# Patient Record
Sex: MALE | Race: WHITE
[De-identification: names, ages, dates, MRNs, and addresses within clinical notes are randomized per-mention and may not be internally consistent; named-entity substitution may affect disease eponyms.]

---

## 2017-09-13 ENCOUNTER — HOSPITAL ENCOUNTER (OUTPATIENT)
Dept: HOSPITAL 74 - FASU-ENDO | Age: 72
Discharge: HOME | End: 2017-09-13
Attending: INTERNAL MEDICINE
Payer: COMMERCIAL

## 2017-09-13 VITALS — HEART RATE: 69 BPM | DIASTOLIC BLOOD PRESSURE: 65 MMHG | SYSTOLIC BLOOD PRESSURE: 110 MMHG

## 2017-09-13 VITALS — TEMPERATURE: 98 F

## 2017-09-13 VITALS — BODY MASS INDEX: 27.3 KG/M2

## 2017-09-13 DIAGNOSIS — Z80.0: ICD-10-CM

## 2017-09-13 DIAGNOSIS — Z86.010: Primary | ICD-10-CM

## 2017-09-13 DIAGNOSIS — K64.8: ICD-10-CM

## 2017-09-13 PROCEDURE — 0DJD8ZZ INSPECTION OF LOWER INTESTINAL TRACT, VIA NATURAL OR ARTIFICIAL OPENING ENDOSCOPIC: ICD-10-PCS | Performed by: INTERNAL MEDICINE

## 2022-09-13 ENCOUNTER — TRANSCRIPTION ENCOUNTER (OUTPATIENT)
Age: 77
End: 2022-09-13

## 2022-09-19 ENCOUNTER — TRANSCRIPTION ENCOUNTER (OUTPATIENT)
Age: 77
End: 2022-09-19

## 2023-03-07 PROBLEM — Z00.00 ENCOUNTER FOR PREVENTIVE HEALTH EXAMINATION: Status: ACTIVE | Noted: 2023-03-07

## 2023-03-29 ENCOUNTER — HOSPITAL ENCOUNTER (OUTPATIENT)
Dept: HOSPITAL 74 - FASU-ENDO | Age: 78
Discharge: HOME | End: 2023-03-29
Attending: INTERNAL MEDICINE
Payer: COMMERCIAL

## 2023-03-29 VITALS
SYSTOLIC BLOOD PRESSURE: 112 MMHG | TEMPERATURE: 98 F | HEART RATE: 72 BPM | DIASTOLIC BLOOD PRESSURE: 62 MMHG | RESPIRATION RATE: 19 BRPM

## 2023-03-29 VITALS — BODY MASS INDEX: 27.7 KG/M2

## 2023-03-29 DIAGNOSIS — K64.0: ICD-10-CM

## 2023-03-29 DIAGNOSIS — Z80.0: ICD-10-CM

## 2023-03-29 DIAGNOSIS — K57.30: ICD-10-CM

## 2023-03-29 DIAGNOSIS — Z86.010: ICD-10-CM

## 2023-03-29 DIAGNOSIS — Z12.11: Primary | ICD-10-CM

## 2023-03-29 PROCEDURE — 0DJD8ZZ INSPECTION OF LOWER INTESTINAL TRACT, VIA NATURAL OR ARTIFICIAL OPENING ENDOSCOPIC: ICD-10-PCS | Performed by: INTERNAL MEDICINE

## 2023-04-05 ENCOUNTER — APPOINTMENT (OUTPATIENT)
Dept: RADIATION ONCOLOGY | Facility: CLINIC | Age: 78
End: 2023-04-05
Payer: MEDICARE

## 2023-04-05 VITALS
OXYGEN SATURATION: 98 % | TEMPERATURE: 97.8 F | BODY MASS INDEX: 28.14 KG/M2 | HEIGHT: 69 IN | DIASTOLIC BLOOD PRESSURE: 61 MMHG | HEART RATE: 78 BPM | RESPIRATION RATE: 16 BRPM | SYSTOLIC BLOOD PRESSURE: 131 MMHG | WEIGHT: 190 LBS

## 2023-04-05 PROCEDURE — 99205 OFFICE O/P NEW HI 60 MIN: CPT | Mod: 25

## 2023-04-11 NOTE — VITALS
[Maximal Pain Intensity: 3/10] : 3/10 [Least Pain Intensity: 0/10] : 0/10 [90: Able to carry normal activity; minor signs or symptoms of disease.] : 90: Able to carry normal activity; minor signs or symptoms of disease.  [Date: ____________] : Patient's last distress assessment performed on [unfilled]. [0 - No Distress] : Distress Level: 0

## 2023-04-11 NOTE — DISEASE MANAGEMENT
[>20] : >20 ng/mL [Biopsy] : Patient had a biopsy on [7(3+4)] : Template Biopsy [place] Score: 7(3+4) [1] : T1 [c] : c [0] : N0 [X] : MX [] : Patient had a Prostate MRI [5] : 5 [Extracapsular Extension] : Extracapsular extension [BiopsyDate] : 05/22 [MeasuredProstateVolume] : 70 [TotalCores] : 6 [TotalPositiveCores] : 1 [MaxCoreInvolvement] : 5 [IIIA] : IIIA [LastPSADate] : 08/22 [LastPSAResults] : 23.1

## 2023-04-11 NOTE — PHYSICAL EXAM
[Normal] : oriented to person, place and time, the affect was normal, the mood was normal and not anxious [FreeTextEntry1] : Unable to palpate any nodules, prostate was enlarged

## 2023-04-11 NOTE — HISTORY OF PRESENT ILLNESS
[FreeTextEntry1] : Mr. 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  MRI demonstrated suspicious for extracapsular extension.\par \par Patient was found to have a PSA of 23.1 ng/mL on 8/30/22. Prostate biopsy was performed on 5/19/22 and revealed [place] 7 (3+4) left mid gland in 1/6 cores . Mr. Cowan is a patient of  urologist Dr. Rico Pichardo who  has been monitoring patients PSA and biopsy results since 2018.\par \par \par MRI prostate (12/27/22) demonstrated a eft peripheral zone PIRADS 4 lesion (mid gland-apex) and larger PIRADS 5 curvilinear right-sided peripheral zone lesion (base to mid gland).  PIRADS 5- Very high. No gross extraprostatic extension, seminal vesicle invasion, or pelvic lymphadenopathy. Broad-based capsular abutment of the right peripheral zone lesion versus suspicion for microscopic extracapsular extension. 6 mm nodule anterior to the right iliacus muscle of uncertain significance. \par \par \par The patient reports no dysuria,  or hematuria.  He has come frequency and urgency mostly at night, he notes noctura x2, he denies erectile dysfunction.  His bowels are regular his last colonoscopy was in 2023 according to the patient, official report is unavailable at this time. Mr. Cowan exercises daily, he skiis and golfs regularly and has a healthy appetite.\par

## 2023-04-11 NOTE — LETTER CLOSING
[Consult Closing:] : Thank you for allowing me to participate in the care of this patient.  If you have any questions, please do not hesitate to contact me. [Sincerely yours,] : Sincerely yours, [FreeTextEntry3] : Radha Osborne MD\par

## 2023-05-02 ENCOUNTER — NON-APPOINTMENT (OUTPATIENT)
Age: 78
End: 2023-05-02

## 2023-06-20 ENCOUNTER — NON-APPOINTMENT (OUTPATIENT)
Age: 78
End: 2023-06-20

## 2023-06-20 VITALS
DIASTOLIC BLOOD PRESSURE: 78 MMHG | OXYGEN SATURATION: 98 % | SYSTOLIC BLOOD PRESSURE: 152 MMHG | HEIGHT: 69 IN | RESPIRATION RATE: 18 BRPM | HEART RATE: 71 BPM

## 2023-06-20 DIAGNOSIS — R30.0 DYSURIA: ICD-10-CM

## 2023-06-21 NOTE — DISEASE MANAGEMENT
[Clinical] : TNM Stage: c [IIIA] : IIIA [1] : T1 [c] : c [0] : N0 [X] : MX [>20] : >20 ng/mL [Biopsy] : Patient had a biopsy on [7(3+4)] : Template Biopsy [place] Score: 7(3+4) [] : Patient had a Prostate MRI [5] : 5 [Extracapsular Extension] : Extracapsular extension [BiopsyDate] : 05/22 [MeasuredProstateVolume] : 70 [TotalCores] : 6 [TotalPositiveCores] : 1 [MaxCoreInvolvement] : 5 [TTNM] : 1c [NTNM] : 0 [MTNM] : 0 [LastPSADate] : 08/22 [LastPSAResults] : 23.1

## 2023-06-21 NOTE — HISTORY OF PRESENT ILLNESS
[FreeTextEntry1] : Mr. 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 MRI demonstrated suspicion for extracapsular extension.\par \par 6/20/2023\par Mr. Cowan presents today for his  OTV, completed 5/26 fractions to a dose of 1350 cGy. He notes a history of nocturia x 2 and since starting treatment that has increased to 3-4. He denies diarrhea. He is in the process of selling his house and has been very busy moving.

## 2023-06-27 ENCOUNTER — NON-APPOINTMENT (OUTPATIENT)
Age: 78
End: 2023-06-27

## 2023-06-27 VITALS
SYSTOLIC BLOOD PRESSURE: 151 MMHG | OXYGEN SATURATION: 98 % | HEIGHT: 69 IN | DIASTOLIC BLOOD PRESSURE: 76 MMHG | RESPIRATION RATE: 18 BRPM | HEART RATE: 74 BPM

## 2023-06-28 ENCOUNTER — NON-APPOINTMENT (OUTPATIENT)
Age: 78
End: 2023-06-28

## 2023-06-28 NOTE — DISEASE MANAGEMENT
[TTNM] : 1c [NTNM] : 0 [MTNM] : 0 [de-identified] : 2700 cGY  [de-identified] : 7020 cGy  [de-identified] : prostate

## 2023-07-03 ENCOUNTER — NON-APPOINTMENT (OUTPATIENT)
Age: 78
End: 2023-07-03

## 2023-07-06 VITALS
DIASTOLIC BLOOD PRESSURE: 70 MMHG | RESPIRATION RATE: 18 BRPM | SYSTOLIC BLOOD PRESSURE: 140 MMHG | HEART RATE: 74 BPM | HEIGHT: 69 IN | OXYGEN SATURATION: 98 %

## 2023-07-07 NOTE — HISTORY OF PRESENT ILLNESS
[FreeTextEntry1] : Mr. 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 MRI demonstrated suspicion for extracapsular extension.\par \par 7/62023\par Mr. Cowan presents today for his  OTV, completed 15/26 fractions to the prostate. He notes urnary frequency but consider this to be a chronic issue. He notes nocturia 2-3/night. He denies diarrhea.

## 2023-07-11 ENCOUNTER — NON-APPOINTMENT (OUTPATIENT)
Age: 78
End: 2023-07-11

## 2023-07-11 VITALS
RESPIRATION RATE: 16 BRPM | HEART RATE: 64 BPM | HEIGHT: 69 IN | SYSTOLIC BLOOD PRESSURE: 148 MMHG | DIASTOLIC BLOOD PRESSURE: 77 MMHG | OXYGEN SATURATION: 99 %

## 2023-07-12 NOTE — DISEASE MANAGEMENT
[Clinical] : TNM Stage: c [IIIA] : IIIA [LastPSADate] : 08/22 [LastPSAResults] : 23.1 [TTNM] : 1c [NTNM] : 0 [MTNM] : 0 [de-identified] : 0613 [de-identified] : 5436 [de-identified] : prostate

## 2023-07-12 NOTE — HISTORY OF PRESENT ILLNESS
[FreeTextEntry1] : Mr. 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 MRI demonstrated suspicion for extracapsular extension.\par \par 7/3/2023\par Mr. Cowan presents today for his  OTV, completed 19/26 fractions to the prostate to a dose of 5130 cGy. He notes that his nocturia has worsened but during the day his frequency and urgency are relatively stable. He also notes constipation like symptoms due to having very small bowel movement with incomplete emptying.

## 2023-07-18 ENCOUNTER — NON-APPOINTMENT (OUTPATIENT)
Age: 78
End: 2023-07-18

## 2023-07-18 VITALS
DIASTOLIC BLOOD PRESSURE: 68 MMHG | SYSTOLIC BLOOD PRESSURE: 141 MMHG | HEIGHT: 69 IN | RESPIRATION RATE: 18 BRPM | OXYGEN SATURATION: 98 % | HEART RATE: 72 BPM

## 2023-07-18 NOTE — REVIEW OF SYSTEMS
[Anal Pain: Grade 0] : Anal Pain: Grade 0 [Constipation: Grade 0] : Constipation: Grade 0 [Diarrhea: Grade 0] : Diarrhea: Grade 0 [Fecal Incontinence: Grade 0] : Fecal Incontinence: Grade 0 [Proctitis: Grade 0] : Proctitis: Grade 0 [Rectal Pain: Grade 0] : Rectal Pain: Grade 0 [Fatigue: Grade 1 - Fatigue relieved by rest] : Fatigue: Grade 1 - Fatigue relieved by rest [Hematuria: Grade 0] : Hematuria: Grade 0 [Urinary Incontinence: Grade 0] : Urinary Incontinence: Grade 0  [Urinary Retention: Grade 0] : Urinary Retention: Grade 0 [Urinary Tract Pain: Grade 0] : Urinary Tract Pain: Grade 0 [Urinary Urgency: Grade 0] : Urinary Urgency: Grade 0 [Urinary Frequency: Grade 0] : Urinary Frequency: Grade 0 [Alopecia: Grade 0] : Alopecia: Grade 0 [Pruritus: Grade 0] : Pruritus: Grade 0 [Skin Atrophy: Grade 0] : Skin Atrophy: Grade 0 [Skin Hyperpigmentation: Grade 0] : Skin Hyperpigmentation: Grade 0 [Skin Induration: Grade 0] : Skin Induration: Grade 0 [Dermatitis Radiation: Grade 0] : Dermatitis Radiation: Grade 0

## 2023-07-23 NOTE — DISEASE MANAGEMENT
[Clinical] : TNM Stage: c [IIIA] : IIIA [TTNM] : 1c [MTNM] : 0 [NTNM] : 0 [de-identified] : 0666 [de-identified] : 3531 [de-identified] : prostate

## 2023-07-23 NOTE — HISTORY OF PRESENT ILLNESS
[FreeTextEntry1] : Mr. 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 MRI demonstrated suspicion for extracapsular extension. \par \par 07/18/23 Pt presents today for OTV. He has completed 23 fractions to a dose of 6210. Pt states he feels well overall and denies any pain or gastrointestinal/urinary disturbance. He feels that his urinary symptoms are stable, nocturia 2-3/night. .

## 2023-08-11 DIAGNOSIS — R33.9 RETENTION OF URINE, UNSPECIFIED: ICD-10-CM

## 2023-08-11 RX ORDER — TAMSULOSIN HYDROCHLORIDE 0.4 MG/1
0.4 CAPSULE ORAL DAILY
Qty: 90 | Refills: 2 | Status: ACTIVE | COMMUNITY
Start: 2023-08-11 | End: 1900-01-01

## 2023-09-07 ENCOUNTER — APPOINTMENT (OUTPATIENT)
Dept: RADIATION ONCOLOGY | Facility: CLINIC | Age: 78
End: 2023-09-07
Payer: MEDICARE

## 2023-09-07 VITALS
HEART RATE: 91 BPM | DIASTOLIC BLOOD PRESSURE: 78 MMHG | RESPIRATION RATE: 17 BRPM | SYSTOLIC BLOOD PRESSURE: 136 MMHG | OXYGEN SATURATION: 98 %

## 2023-09-07 PROCEDURE — 99024 POSTOP FOLLOW-UP VISIT: CPT

## 2023-09-07 RX ORDER — TAMSULOSIN HYDROCHLORIDE 0.4 MG/1
0.4 CAPSULE ORAL
Qty: 30 | Refills: 1 | Status: COMPLETED | COMMUNITY
Start: 2023-06-20 | End: 2023-09-07

## 2023-09-07 RX ORDER — SIMVASTATIN 80 MG/1
TABLET, FILM COATED ORAL
Refills: 0 | Status: ACTIVE | COMMUNITY

## 2023-09-07 RX ORDER — BICALUTAMIDE 50 MG/1
TABLET ORAL
Refills: 0 | Status: ACTIVE | COMMUNITY

## 2023-09-07 NOTE — HISTORY OF PRESENT ILLNESS
[FreeTextEntry1] : Mr. Nikita Cowan is a 77-year-old male diagnosed with high-risk prostate adenocarcinoma, GS 3+4, PSA 23.1ng/ml. MRI demonstrated suspicious for extracapsular extension s/p radiation therapy.   Patient was found to have a PSA of 23.1 ng/mL on 8/30/22. Prostate biopsy was performed on 5/19/22 and revealed [place] 7 (3+4) left mid gland in 1/6 cores. Mr. Cowan is a patient of urologist Dr. Rico Picharod who has been monitoring patients PSA and biopsy results since 2018.  MRI prostate (12/27/22) demonstrated a left peripheral zone PIRADS 4 lesion (mid gland-apex) and larger PIRADS 5 curvilinear right-sided peripheral zone lesion (base to mid gland). PIRADS 5- Very high. No gross extraprostatic extension, seminal vesicle invasion, or pelvic lymphadenopathy. Broad-based capsular abutment of the right peripheral zone lesion versus suspicion for microscopic extracapsular extension. 6 mm nodule anterior to the right iliacus muscle of uncertain significance.  Mr. Cowan was treated with ADT and completed RT to the prostate/SV/nodes to a dose of 7020 cGy on 7/21/23.    Mr. Cowan presents today for post-treatment evaluation. He states he feels well overall. He continues on ADT and reports the main side effect he experiences is loss of libido. He reports nocturia of 3 episodes per night on average and continues on the Flomax. He denies any gastrointestinal symptoms. He feels that his energy level and urinary symptoms have returned to baseline.

## 2023-09-07 NOTE — DISEASE MANAGEMENT
[Clinical] : TNM Stage: c [IIIA] : IIIA [FreeTextEntry4] : High-risk prostate adenocarcinoma GS 3+4 PSA 23.1 ng/ml [TTNM] : 1c [NTNM] : 0 [MTNM] : X

## 2023-09-07 NOTE — REVIEW OF SYSTEMS
[Nocturia] : nocturia [Negative] : Musculoskeletal [Constipation: Grade 0] : Constipation: Grade 0 [Diarrhea: Grade 0] : Diarrhea: Grade 0 [Fatigue: Grade 0] : Fatigue: Grade 0 [Hematuria: Grade 0] : Hematuria: Grade 0 [Urinary Incontinence: Grade 0] : Urinary Incontinence: Grade 0  [Urinary Retention: Grade 0] : Urinary Retention: Grade 0 [Urinary Tract Pain: Grade 0] : Urinary Tract Pain: Grade 0 [Urinary Urgency: Grade 0] : Urinary Urgency: Grade 0 [Urinary Frequency: Grade 0] : Urinary Frequency: Grade 0 [Libido Decreased: Grade 2 - Decrease in sexual interest adversely affecting relationship] : Libido Decreased: Grade 2 - Decrease in sexual interest adversely affecting relationship [Chest Pain] : no chest pain [Shortness Of Breath] : no shortness of breath [Cough] : no cough [Urinary Frequency] : no urinary frequency [Hot Flashes] : no hot flashes [FreeTextEntry8] : 3 episodes per night on average

## 2023-12-28 ENCOUNTER — APPOINTMENT (OUTPATIENT)
Dept: RADIATION ONCOLOGY | Facility: CLINIC | Age: 78
End: 2023-12-28
Payer: MEDICARE

## 2023-12-28 PROCEDURE — 99214 OFFICE O/P EST MOD 30 MIN: CPT

## 2024-01-02 NOTE — HISTORY OF PRESENT ILLNESS
[FreeTextEntry1] : Mr. Nikita Cowan is a 77-year-old male diagnosed with high-risk prostate adenocarcinoma, GS 3+4, PSA 23.1ng/ml. He is status post completion of ADT plus radiation therapy in July 2023, he continues on ADT and presents today for his routine follow-up.   Patient was found to have a PSA of 23.1 ng/mL on 8/30/22. Prostate biopsy was performed on 5/19/22 and revealed [place] 7 (3+4) left mid gland in 1/6 cores. Mr. Cowan is a patient of urologist Dr. Rico Pichardo who has been monitoring patients PSA and biopsy results since 2018.  MRI prostate (12/27/22) demonstrated a left peripheral zone PIRADS 4 lesion (mid gland-apex) and larger PIRADS 5 curvilinear right-sided peripheral zone lesion (base to mid gland). PIRADS 5- Very high. No gross extraprostatic extension, seminal vesicle invasion, or pelvic lymphadenopathy. Broad-based capsular abutment of the right peripheral zone lesion versus suspicion for microscopic extracapsular extension. 6 mm nodule anterior to the right iliacus muscle of uncertain significance.  Mr. Cowan was treated with ADT and completed RT to the prostate/SV/nodes to a dose of 7020 cGy on 7/21/23, Mr. Cowan tolerated treatment well with minimal side effects.   11/21/2023:  PSA 0.01 ng/ml  Mr. Cowan presents today for his routine follow-up.  He continues on ADT under the care of Dr. Pichardo.  Urination has returned to baseline. He reports nocturia of 3 episodes per night on average depending on his fluid intake. He continues to take Flomax but would like to discontinue. He denies any bowel issues. He is looking forward to spending the holiday with his granddaughter.

## 2024-01-02 NOTE — PHYSICAL EXAM
[Normal] : no joint swelling, no clubbing or cyanosis of the fingernails and muscle strength and tone were normal [FreeTextEntry1] : deferred

## 2024-06-27 ENCOUNTER — APPOINTMENT (OUTPATIENT)
Dept: RADIATION ONCOLOGY | Facility: CLINIC | Age: 79
End: 2024-06-27
Payer: MEDICARE

## 2024-06-27 VITALS
DIASTOLIC BLOOD PRESSURE: 55 MMHG | OXYGEN SATURATION: 97 % | RESPIRATION RATE: 18 BRPM | HEART RATE: 75 BPM | SYSTOLIC BLOOD PRESSURE: 113 MMHG | TEMPERATURE: 97.5 F

## 2024-06-27 DIAGNOSIS — C61 MALIGNANT NEOPLASM OF PROSTATE: ICD-10-CM

## 2024-06-27 PROCEDURE — 99214 OFFICE O/P EST MOD 30 MIN: CPT

## 2024-06-27 PROCEDURE — G2211 COMPLEX E/M VISIT ADD ON: CPT

## 2024-07-09 ENCOUNTER — NON-APPOINTMENT (OUTPATIENT)
Age: 79
End: 2024-07-09

## 2024-07-09 ENCOUNTER — RESULT REVIEW (OUTPATIENT)
Age: 79
End: 2024-07-09

## 2024-07-09 ENCOUNTER — APPOINTMENT (OUTPATIENT)
Dept: HEMATOLOGY ONCOLOGY | Facility: CLINIC | Age: 79
End: 2024-07-09
Payer: MEDICARE

## 2024-07-09 VITALS
OXYGEN SATURATION: 98 % | SYSTOLIC BLOOD PRESSURE: 117 MMHG | TEMPERATURE: 97.5 F | DIASTOLIC BLOOD PRESSURE: 68 MMHG | HEIGHT: 69 IN | RESPIRATION RATE: 16 BRPM | BODY MASS INDEX: 29.63 KG/M2 | WEIGHT: 200.06 LBS | HEART RATE: 79 BPM

## 2024-07-09 DIAGNOSIS — Z78.9 OTHER SPECIFIED HEALTH STATUS: ICD-10-CM

## 2024-07-09 DIAGNOSIS — C61 MALIGNANT NEOPLASM OF PROSTATE: ICD-10-CM

## 2024-07-09 PROCEDURE — 99205 OFFICE O/P NEW HI 60 MIN: CPT

## 2024-08-07 ENCOUNTER — RESULT REVIEW (OUTPATIENT)
Age: 79
End: 2024-08-07

## 2024-12-05 ENCOUNTER — APPOINTMENT (OUTPATIENT)
Dept: RADIATION ONCOLOGY | Facility: CLINIC | Age: 79
End: 2024-12-05
Payer: MEDICARE

## 2024-12-05 VITALS
HEART RATE: 76 BPM | TEMPERATURE: 97.5 F | SYSTOLIC BLOOD PRESSURE: 133 MMHG | RESPIRATION RATE: 18 BRPM | DIASTOLIC BLOOD PRESSURE: 69 MMHG | HEIGHT: 69 IN | OXYGEN SATURATION: 98 %

## 2024-12-05 DIAGNOSIS — C61 MALIGNANT NEOPLASM OF PROSTATE: ICD-10-CM

## 2024-12-05 PROCEDURE — G2211 COMPLEX E/M VISIT ADD ON: CPT

## 2024-12-05 PROCEDURE — 99214 OFFICE O/P EST MOD 30 MIN: CPT

## 2024-12-25 PROBLEM — F10.90 ALCOHOL USE: Status: ACTIVE | Noted: 2024-07-09

## 2025-01-16 ENCOUNTER — RESULT REVIEW (OUTPATIENT)
Age: 80
End: 2025-01-16

## 2025-01-16 ENCOUNTER — APPOINTMENT (OUTPATIENT)
Dept: HEMATOLOGY ONCOLOGY | Facility: CLINIC | Age: 80
End: 2025-01-16
Payer: MEDICARE

## 2025-01-16 VITALS
RESPIRATION RATE: 16 BRPM | OXYGEN SATURATION: 99 % | WEIGHT: 203.56 LBS | BODY MASS INDEX: 30.15 KG/M2 | DIASTOLIC BLOOD PRESSURE: 71 MMHG | SYSTOLIC BLOOD PRESSURE: 138 MMHG | TEMPERATURE: 96.9 F | HEART RATE: 75 BPM | HEIGHT: 69 IN

## 2025-01-16 DIAGNOSIS — M85.80 OTHER SPECIFIED DISORDERS OF BONE DENSITY AND STRUCTURE, UNSPECIFIED SITE: ICD-10-CM

## 2025-01-16 DIAGNOSIS — C61 MALIGNANT NEOPLASM OF PROSTATE: ICD-10-CM

## 2025-01-16 PROCEDURE — 36415 COLL VENOUS BLD VENIPUNCTURE: CPT

## 2025-01-16 PROCEDURE — 99214 OFFICE O/P EST MOD 30 MIN: CPT

## 2025-02-20 DIAGNOSIS — M25.511 PAIN IN RIGHT SHOULDER: ICD-10-CM

## 2025-02-24 ENCOUNTER — APPOINTMENT (OUTPATIENT)
Dept: ORTHOPEDIC SURGERY | Facility: CLINIC | Age: 80
End: 2025-02-24
Payer: MEDICARE

## 2025-02-24 VITALS
DIASTOLIC BLOOD PRESSURE: 85 MMHG | SYSTOLIC BLOOD PRESSURE: 128 MMHG | BODY MASS INDEX: 30.07 KG/M2 | RESPIRATION RATE: 20 BRPM | HEIGHT: 69 IN | TEMPERATURE: 97.3 F | OXYGEN SATURATION: 98 % | WEIGHT: 203 LBS | HEART RATE: 90 BPM

## 2025-02-24 DIAGNOSIS — S43.421A SPRAIN OF RIGHT ROTATOR CUFF CAPSULE, INITIAL ENCOUNTER: ICD-10-CM

## 2025-02-24 DIAGNOSIS — M75.01 ADHESIVE CAPSULITIS OF RIGHT SHOULDER: ICD-10-CM

## 2025-02-24 DIAGNOSIS — M75.21 BICIPITAL TENDINITIS, RIGHT SHOULDER: ICD-10-CM

## 2025-02-24 DIAGNOSIS — M25.811 OTHER SPECIFIED JOINT DISORDERS, RIGHT SHOULDER: ICD-10-CM

## 2025-02-24 PROCEDURE — 20611 DRAIN/INJ JOINT/BURSA W/US: CPT | Mod: RT

## 2025-02-24 PROCEDURE — 99204 OFFICE O/P NEW MOD 45 MIN: CPT | Mod: 25

## 2025-02-24 PROCEDURE — 73030 X-RAY EXAM OF SHOULDER: CPT | Mod: RT

## 2025-02-24 PROCEDURE — 99214 OFFICE O/P EST MOD 30 MIN: CPT | Mod: 25

## 2025-04-04 ENCOUNTER — NON-APPOINTMENT (OUTPATIENT)
Age: 80
End: 2025-04-04

## 2025-06-05 ENCOUNTER — APPOINTMENT (OUTPATIENT)
Dept: RADIATION ONCOLOGY | Facility: CLINIC | Age: 80
End: 2025-06-05
Payer: MEDICARE

## 2025-06-05 VITALS
HEIGHT: 69 IN | WEIGHT: 198 LBS | HEART RATE: 80 BPM | BODY MASS INDEX: 29.33 KG/M2 | DIASTOLIC BLOOD PRESSURE: 55 MMHG | TEMPERATURE: 97.6 F | RESPIRATION RATE: 20 BRPM | OXYGEN SATURATION: 96 % | SYSTOLIC BLOOD PRESSURE: 114 MMHG

## 2025-06-05 DIAGNOSIS — K62.89 OTHER SPECIFIED DISEASES OF ANUS AND RECTUM: ICD-10-CM

## 2025-06-05 DIAGNOSIS — C61 MALIGNANT NEOPLASM OF PROSTATE: ICD-10-CM

## 2025-06-05 PROCEDURE — G2211 COMPLEX E/M VISIT ADD ON: CPT

## 2025-06-05 PROCEDURE — 99214 OFFICE O/P EST MOD 30 MIN: CPT

## 2025-08-28 ENCOUNTER — APPOINTMENT (OUTPATIENT)
Dept: GASTROENTEROLOGY | Facility: CLINIC | Age: 80
End: 2025-08-28
Payer: MEDICARE

## 2025-08-28 VITALS
OXYGEN SATURATION: 97 % | SYSTOLIC BLOOD PRESSURE: 120 MMHG | HEIGHT: 68 IN | DIASTOLIC BLOOD PRESSURE: 84 MMHG | WEIGHT: 198 LBS | HEART RATE: 74 BPM | BODY MASS INDEX: 30.01 KG/M2

## 2025-08-28 DIAGNOSIS — Z86.39 PERSONAL HISTORY OF OTHER ENDOCRINE, NUTRITIONAL AND METABOLIC DISEASE: ICD-10-CM

## 2025-08-28 DIAGNOSIS — R19.4 CHANGE IN BOWEL HABIT: ICD-10-CM

## 2025-08-28 DIAGNOSIS — K62.5 HEMORRHAGE OF ANUS AND RECTUM: ICD-10-CM

## 2025-08-28 PROCEDURE — G2211 COMPLEX E/M VISIT ADD ON: CPT

## 2025-08-28 PROCEDURE — 99203 OFFICE O/P NEW LOW 30 MIN: CPT
